# Patient Record
Sex: MALE | Race: ASIAN | URBAN - METROPOLITAN AREA
[De-identification: names, ages, dates, MRNs, and addresses within clinical notes are randomized per-mention and may not be internally consistent; named-entity substitution may affect disease eponyms.]

---

## 2023-12-21 ENCOUNTER — OFFICE VISIT (OUTPATIENT)
Dept: URGENT CARE | Facility: CLINIC | Age: 33
End: 2023-12-21
Payer: COMMERCIAL

## 2023-12-21 VITALS
TEMPERATURE: 98.6 F | WEIGHT: 158.6 LBS | BODY MASS INDEX: 25.49 KG/M2 | OXYGEN SATURATION: 100 % | HEIGHT: 66 IN | HEART RATE: 88 BPM | DIASTOLIC BLOOD PRESSURE: 82 MMHG | RESPIRATION RATE: 20 BRPM | SYSTOLIC BLOOD PRESSURE: 138 MMHG

## 2023-12-21 DIAGNOSIS — Z02.4 DRIVER'S PERMIT PHYSICAL EXAMINATION: Primary | ICD-10-CM

## 2023-12-21 NOTE — PROGRESS NOTES
"  [facility]        NAME: Festus Farah is a 33 y.o. male  : 1990    MRN: [id number]  DATE: 2023  TIME: 4:43 PM    Assessment and Plan   's permit physical examination [Z02.4]  1. 's permit physical examination              Patient Instructions       Follow up with PCP in 3-5 days.  Proceed to  ER if symptoms worsen.    Chief Complaint     Chief Complaint   Patient presents with    Annual Exam     Drivers permit         History of Present Illness       Patient is a 33 year old male presenting to [facility] for 's permit physical examination.  Patient denies any medical history.          Review of Systems   Review of Systems   Constitutional:  Negative for chills and fever.   HENT:  Negative for ear pain and sore throat.    Eyes:  Negative for pain and visual disturbance.   Respiratory:  Negative for cough and shortness of breath.    Cardiovascular:  Negative for chest pain and palpitations.   Gastrointestinal:  Negative for abdominal pain and vomiting.   Genitourinary:  Negative for dysuria and hematuria.   Musculoskeletal:  Negative for arthralgias and back pain.   Skin:  Negative for color change and rash.   Neurological:  Negative for seizures and syncope.   All other systems reviewed and are negative.        Current Medications     No current outpatient medications on file.    Current Allergies     Allergies as of 2023    (No Known Allergies)            The following portions of the patient's history were reviewed and updated as appropriate: allergies, current medications, past family history, past medical history, past social history, past surgical history and problem list.     No past medical history on file.    No past surgical history on file.    No family history on file.      Medications have been verified.        Objective   /82   Pulse 88   Temp 98.6 °F (37 °C)   Resp 20   Ht 5' 6\" (1.676 m)   Wt 71.9 kg (158 lb 9.6 oz)   SpO2 100%   BMI " 25.60 kg/m²   No LMP for male patient.       Physical Exam     Physical Exam  Constitutional:       Appearance: Normal appearance. He is normal weight.   HENT:      Head: Normocephalic and atraumatic.      Nose: Nose normal.      Mouth/Throat:      Mouth: Mucous membranes are moist.   Eyes:      Extraocular Movements: Extraocular movements intact.      Conjunctiva/sclera: Conjunctivae normal.      Pupils: Pupils are equal, round, and reactive to light.   Cardiovascular:      Rate and Rhythm: Normal rate.      Heart sounds: No murmur heard.     No friction rub. No gallop.   Pulmonary:      Effort: Pulmonary effort is normal.      Breath sounds: No wheezing, rhonchi or rales.   Musculoskeletal:         General: Normal range of motion.      Cervical back: Normal range of motion and neck supple.   Skin:     General: Skin is warm and dry.   Neurological:      General: No focal deficit present.      Mental Status: He is alert and oriented to person, place, and time.   Psychiatric:         Mood and Affect: Mood normal.         Behavior: Behavior normal.

## 2024-12-26 ENCOUNTER — HOSPITAL ENCOUNTER (EMERGENCY)
Facility: HOSPITAL | Age: 34
Discharge: HOME | End: 2024-12-27
Attending: EMERGENCY MEDICINE | Admitting: EMERGENCY MEDICINE

## 2024-12-26 DIAGNOSIS — Z87.448 HISTORY OF BLOOD IN URINE: Primary | ICD-10-CM

## 2024-12-26 LAB
BILIRUB UR QL STRIP: NEGATIVE
CLARITY UR: CLEAR
COLOR UR: YELLOW
GLUCOSE UR STRIP-MCNC: ABNORMAL MG/DL
HGB UR QL STRIP.AUTO: NEGATIVE
KETONES UR STRIP-MCNC: NEGATIVE MG/DL
LEUKOCYTE ESTERASE UR QL STRIP: NEGATIVE
NITRITE UR QL STRIP: NEGATIVE
PH UR STRIP.AUTO: 7.5 [PH]
PROT UR STRIP-MCNC: NEGATIVE MG/DL
SP GR UR STRIP.AUTO: 1.01 (ref 1–1.03)
UROBILINOGEN UR STRIP-ACNC: <2 MG/DL

## 2024-12-26 PROCEDURE — 99283 EMERGENCY DEPT VISIT LOW MDM: CPT

## 2024-12-26 PROCEDURE — 99283 EMERGENCY DEPT VISIT LOW MDM: CPT | Performed by: EMERGENCY MEDICINE

## 2024-12-27 VITALS
HEIGHT: 66 IN | RESPIRATION RATE: 18 BRPM | WEIGHT: 160 LBS | SYSTOLIC BLOOD PRESSURE: 141 MMHG | OXYGEN SATURATION: 99 % | BODY MASS INDEX: 25.71 KG/M2 | TEMPERATURE: 97.9 F | DIASTOLIC BLOOD PRESSURE: 84 MMHG | HEART RATE: 93 BPM

## 2024-12-27 NOTE — ED PROVIDER NOTES
Time reflects when diagnosis was documented in both MDM as applicable and the Disposition within this note       Time User Action Codes Description Comment    12/26/2024 11:48 PM Darius Stanford Add [Z87.448] History of blood in urine     12/26/2024 11:48 PM Darius Stanford Modify [Z87.448] History of blood in urine Normal urinalysis          ED Disposition       ED Disposition   Discharge    Condition   Stable    Date/Time   u Dec 26, 2024 11:48 PM    Comment   Festus Urbanel discharge to home/self care.                   Assessment & Plan       Medical Decision Making  34-year-old male presents with concerns for gross hematuria in his urine at urination this morning.  This is his first time he is ever experienced this there was no pain with it.  Differential diagnosis could include urinary retention, kidney stone, gross hematuria due to bladder malignancy.  Will obtain screening urinalysis.  UTI also on differential.  Patient without any clinical evidence of kidney stone urinary retention and does not have any abnormal findings on urinalysis to explain.  Discussed that sometimes the color of the urine may be changed by other things that he is eating or taking or that there could have been some transient irritation or bleeding in the bladder tract which layered at the bottom of the bladder and was peed out in the morning.  Advise continue monitoring and if symptoms return or persist then he should have his urine retested and I will provide urology follow-up.    Problems Addressed:  History of blood in urine: undiagnosed new problem with uncertain prognosis        ED Course as of 12/26/24 2351   Thu Dec 26, 2024   2342 Patient has an unremarkable urinalysis I went back and discussed with the patient the findings.  I gave him reassurance I advised him to seek return to ER for repeat urine testing if the symptoms continue or worsen or he may follow-up with urology as an outpatient will provide contact  information and referral as needed.       Medications - No data to display    ED Risk Strat Scores                          SBIRT 20yo+      Flowsheet Row Most Recent Value   Initial Alcohol Screen: US AUDIT-C     1. How often do you have a drink containing alcohol? 6 Filed at: 12/26/2024 2305   2. How many drinks containing alcohol do you have on a typical day you are drinking?  1 Filed at: 12/26/2024 2305   3a. Male UNDER 65: How often do you have five or more drinks on one occasion? 0 Filed at: 12/26/2024 2305   3b. FEMALE Any Age, or MALE 65+: How often do you have 4 or more drinks on one occassion? 0 Filed at: 12/26/2024 2305   Audit-C Score 7 Filed at: 12/26/2024 2305   Full Alcohol Screen: US AUDIT    4. How often during the last year have you found that you were not able to stop drinking once you had started? 0 Filed at: 12/26/2024 2305   5. How often during past year have you failed to do what was normally expected of you because of drinking?  0 Filed at: 12/26/2024 2305   6. How often in past year have you needed a first drink in the morning to get yourself going after a heavy drinking session?  0 Filed at: 12/26/2024 2305   7. How often in past year have you had feeling of guilt or remorse after drinking?  0 Filed at: 12/26/2024 2305   8. How often in past year have you been unable to remember what happened night before because you had been drinking?  0 Filed at: 12/26/2024 2305   9. Have you or someone else been injured as a result of your drinking?  0 Filed at: 12/26/2024 2305   10. Has a relative, friend, doctor or other health worker been concerned about your drinking and suggested you cut down?  0 Filed at: 12/26/2024 2305   AUDIT Total Score 7 Filed at: 12/26/2024 2305   SRAVAN: How many times in the past year have you...    Used an illegal drug or used a prescription medication for non-medical reasons? Never Filed at: 12/26/2024 2305                            History of Present Illness       Chief  Complaint   Patient presents with    Blood in Urine     Patient states that when he went to the bathroom this morning he noticed a small amount of blood in his urine. Denies abd pain       History reviewed. No pertinent past medical history.   History reviewed. No pertinent surgical history.   History reviewed. No pertinent family history.   Social History     Tobacco Use    Smoking status: Never     Passive exposure: Never    Smokeless tobacco: Never   Vaping Use    Vaping status: Never Used   Substance Use Topics    Alcohol use: Yes     Alcohol/week: 7.0 standard drinks of alcohol     Types: 7 Cans of beer per week    Drug use: Not Currently      E-Cigarette/Vaping    E-Cigarette Use Never User       E-Cigarette/Vaping Substances      I have reviewed and agree with the history as documented.     34-year-old male without significant past medical history who presents to the ER for evaluation of gross hematuria which he noticed this morning when urinating.  Denies any associated dysuria groin pain testicular pain lower abdominal pain suprapubic pain abdominal distention flank pain nausea vomiting.  Denies bleeding disorders.  Denies any rashes in the groin area.  No history of similar.  He states he only noticed today when he first went to urinate in the morning.  It was at the beginning of urination.  It has not persisted since.  Denies any history of STI.  Occasionally chews tobacco but does not smoke tobacco no history of bladder cancer.        Blood in Urine  Pertinent negatives include no abdominal pain, chills, dysuria, fever, flank pain, nausea or vomiting.       Review of Systems   Constitutional:  Negative for activity change, appetite change, chills and fever.   HENT:  Negative for ear pain and sore throat.    Eyes:  Negative for pain and visual disturbance.   Respiratory:  Negative for cough and shortness of breath.    Cardiovascular:  Negative for chest pain and palpitations.   Gastrointestinal:  Negative  for abdominal pain, diarrhea, nausea and vomiting.   Genitourinary:  Positive for hematuria. Negative for dysuria, flank pain, genital sores, penile discharge, penile pain, penile swelling, scrotal swelling and testicular pain.   Musculoskeletal:  Negative for arthralgias and back pain.   Skin:  Negative for color change and rash.   Neurological:  Negative for seizures, syncope, light-headedness and headaches.   All other systems reviewed and are negative.          Objective       ED Triage Vitals [12/26/24 2307]   Temperature Pulse Blood Pressure Respirations SpO2 Patient Position - Orthostatic VS   97.9 °F (36.6 °C) 84 165/97 18 98 % Sitting      Temp Source Heart Rate Source BP Location FiO2 (%) Pain Score    Temporal Monitor Right arm -- No Pain      Vitals      Date and Time Temp Pulse SpO2 Resp BP Pain Score FACES Pain Rating User   12/26/24 2307 97.9 °F (36.6 °C) 84 98 % 18 165/97 No Pain -- AP            Physical Exam  Vitals and nursing note reviewed.   Constitutional:       General: He is not in acute distress.     Appearance: He is well-developed.   HENT:      Head: Normocephalic and atraumatic.      Right Ear: External ear normal.      Left Ear: External ear normal.      Nose: Nose normal.      Mouth/Throat:      Mouth: Mucous membranes are moist.      Pharynx: Oropharynx is clear.   Eyes:      Conjunctiva/sclera: Conjunctivae normal.   Cardiovascular:      Rate and Rhythm: Normal rate and regular rhythm.      Heart sounds: No murmur heard.  Pulmonary:      Effort: Pulmonary effort is normal. No respiratory distress.      Breath sounds: Normal breath sounds.   Abdominal:      Palpations: Abdomen is soft.      Tenderness: There is no abdominal tenderness.   Musculoskeletal:         General: No swelling.      Cervical back: Neck supple.   Skin:     General: Skin is warm and dry.      Capillary Refill: Capillary refill takes less than 2 seconds.   Neurological:      Mental Status: He is alert.    Psychiatric:         Mood and Affect: Mood normal.         Results Reviewed       Procedure Component Value Units Date/Time    UA w Reflex to Microscopic w Reflex to Culture [[id number]]  (Abnormal) Collected: 12/26/24 2333    Lab Status: Final result Specimen: Urine, Clean Catch Updated: 12/26/24 2341     Color, UA Yellow     Clarity, UA Clear     Specific Gravity, UA 1.010     pH, UA 7.5     Leukocytes, UA Negative     Nitrite, UA Negative     Protein, UA Negative mg/dl      Glucose, UA 1000 (1%) mg/dl      Ketones, UA Negative mg/dl      Urobilinogen, UA <2.0 mg/dl      Bilirubin, UA Negative     Occult Blood, UA Negative            No orders to display       Procedures    ED Medication and Procedure Management   None     Patient's Medications    No medications on file     No discharge procedures on file.  ED SEPSIS DOCUMENTATION   Time reflects when diagnosis was documented in both MDM as applicable and the Disposition within this note       Time User Action Codes Description Comment    12/26/2024 11:48 PM Darius Stanford Add [Z87.448] History of blood in urine     12/26/2024 11:48 PM Darius Stanford Modify [Z87.448] History of blood in urine Normal urinalysis                 Darius Stanford DO  12/26/24 0542

## 2024-12-27 NOTE — DISCHARGE INSTRUCTIONS
Thank you for visiting the Emergency Department today.    Labs Reviewed   UA W REFLEX TO MICROSCOPIC WITH REFLEX TO CULTURE - Abnormal       Result Value Ref Range Status    Color, UA Yellow   Final    Clarity, UA Clear   Final    Specific Gravity, UA 1.010  1.005 - 1.030 Final    pH, UA 7.5  4.5, 5.0, 5.5, 6.0, 6.5, 7.0, 7.5, 8.0 Final    Leukocytes, UA Negative  Negative Final    Nitrite, UA Negative  Negative Final    Protein, UA Negative  Negative mg/dl Final    Glucose, UA 1000 (1%) (*) Negative mg/dl Final    Comment: Elevated glucose may cause false negative leukocyte esterase    Ketones, UA Negative  Negative mg/dl Final    Urobilinogen, UA <2.0  <2.0 mg/dl mg/dl Final    Bilirubin, UA Negative  Negative Final    Occult Blood, UA Negative  Negative Final     No evidence of blood or infection in the urine.  There was some glucose in the urine this is normal this is not of concern.  Return here if symptoms worsen or follow-up with urology if symptoms worsen otherwise continue to monitor.  No signs of any acute pathology on urine testing.

## 2024-12-29 ENCOUNTER — APPOINTMENT (EMERGENCY)
Dept: ULTRASOUND IMAGING | Facility: HOSPITAL | Age: 34
End: 2024-12-29

## 2024-12-29 ENCOUNTER — HOSPITAL ENCOUNTER (EMERGENCY)
Facility: HOSPITAL | Age: 34
Discharge: HOME | End: 2024-12-29
Attending: EMERGENCY MEDICINE

## 2024-12-29 VITALS
OXYGEN SATURATION: 98 % | BODY MASS INDEX: 25.82 KG/M2 | TEMPERATURE: 98.2 F | HEART RATE: 79 BPM | DIASTOLIC BLOOD PRESSURE: 87 MMHG | WEIGHT: 160 LBS | RESPIRATION RATE: 16 BRPM | SYSTOLIC BLOOD PRESSURE: 151 MMHG

## 2024-12-29 DIAGNOSIS — R74.01 TRANSAMINITIS: ICD-10-CM

## 2024-12-29 DIAGNOSIS — R36.1 HEMATOSPERMIA: ICD-10-CM

## 2024-12-29 DIAGNOSIS — R31.29 MICROSCOPIC HEMATURIA: Primary | ICD-10-CM

## 2024-12-29 DIAGNOSIS — R73.9 HYPERGLYCEMIA: ICD-10-CM

## 2024-12-29 LAB
ALBUMIN SERPL BCG-MCNC: 4.7 G/DL (ref 3.5–5)
ALP SERPL-CCNC: 68 U/L (ref 34–104)
ALT SERPL W P-5'-P-CCNC: 125 U/L (ref 7–52)
ANION GAP SERPL CALCULATED.3IONS-SCNC: 10 MMOL/L (ref 4–13)
AST SERPL W P-5'-P-CCNC: 63 U/L (ref 13–39)
BACTERIA UR QL AUTO: ABNORMAL /HPF
BASOPHILS # BLD AUTO: 0.04 THOUSANDS/ΜL (ref 0–0.1)
BASOPHILS NFR BLD AUTO: 1 % (ref 0–1)
BILIRUB SERPL-MCNC: 0.7 MG/DL (ref 0.2–1)
BILIRUB UR QL STRIP: NEGATIVE
BUN SERPL-MCNC: 9 MG/DL (ref 5–25)
CALCIUM SERPL-MCNC: 9.7 MG/DL (ref 8.4–10.2)
CHLORIDE SERPL-SCNC: 97 MMOL/L (ref 96–108)
CLARITY UR: CLEAR
CO2 SERPL-SCNC: 25 MMOL/L (ref 21–32)
COLOR UR: YELLOW
CREAT SERPL-MCNC: 0.85 MG/DL (ref 0.6–1.3)
EOSINOPHIL # BLD AUTO: 0.04 THOUSAND/ΜL (ref 0–0.61)
EOSINOPHIL NFR BLD AUTO: 1 % (ref 0–6)
ERYTHROCYTE [DISTWIDTH] IN BLOOD BY AUTOMATED COUNT: 12.8 % (ref 11.6–15.1)
GFR SERPL CREATININE-BSD FRML MDRD: 113 ML/MIN/1.73SQ M
GLUCOSE SERPL-MCNC: 255 MG/DL (ref 65–140)
GLUCOSE UR STRIP-MCNC: ABNORMAL MG/DL
HCT VFR BLD AUTO: 43.4 % (ref 36.5–49.3)
HGB BLD-MCNC: 14.8 G/DL (ref 12–17)
HGB UR QL STRIP.AUTO: ABNORMAL
IMM GRANULOCYTES # BLD AUTO: 0.02 THOUSAND/UL (ref 0–0.2)
IMM GRANULOCYTES NFR BLD AUTO: 0 % (ref 0–2)
KETONES UR STRIP-MCNC: NEGATIVE MG/DL
LEUKOCYTE ESTERASE UR QL STRIP: NEGATIVE
LYMPHOCYTES # BLD AUTO: 0.89 THOUSANDS/ΜL (ref 0.6–4.47)
LYMPHOCYTES NFR BLD AUTO: 20 % (ref 14–44)
MCH RBC QN AUTO: 28.1 PG (ref 26.8–34.3)
MCHC RBC AUTO-ENTMCNC: 34.1 G/DL (ref 31.4–37.4)
MCV RBC AUTO: 83 FL (ref 82–98)
MONOCYTES # BLD AUTO: 0.44 THOUSAND/ΜL (ref 0.17–1.22)
MONOCYTES NFR BLD AUTO: 10 % (ref 4–12)
NEUTROPHILS # BLD AUTO: 3.13 THOUSANDS/ΜL (ref 1.85–7.62)
NEUTS SEG NFR BLD AUTO: 68 % (ref 43–75)
NITRITE UR QL STRIP: NEGATIVE
NON-SQ EPI CELLS URNS QL MICRO: ABNORMAL /HPF
NRBC BLD AUTO-RTO: 0 /100 WBCS
PH UR STRIP.AUTO: 6 [PH]
PLATELET # BLD AUTO: 223 THOUSANDS/UL (ref 149–390)
PMV BLD AUTO: 8.9 FL (ref 8.9–12.7)
POTASSIUM SERPL-SCNC: 4.6 MMOL/L (ref 3.5–5.3)
PROT SERPL-MCNC: 6.8 G/DL (ref 6.4–8.4)
PROT UR STRIP-MCNC: NEGATIVE MG/DL
RBC # BLD AUTO: 5.26 MILLION/UL (ref 3.88–5.62)
RBC #/AREA URNS AUTO: ABNORMAL /HPF
SODIUM SERPL-SCNC: 132 MMOL/L (ref 135–147)
SP GR UR STRIP.AUTO: 1.02 (ref 1–1.03)
UROBILINOGEN UR STRIP-ACNC: <2 MG/DL
WBC # BLD AUTO: 4.56 THOUSAND/UL (ref 4.31–10.16)
WBC #/AREA URNS AUTO: ABNORMAL /HPF

## 2024-12-29 PROCEDURE — 87591 N.GONORRHOEAE DNA AMP PROB: CPT | Performed by: EMERGENCY MEDICINE

## 2024-12-29 PROCEDURE — 83036 HEMOGLOBIN GLYCOSYLATED A1C: CPT | Performed by: EMERGENCY MEDICINE

## 2024-12-29 PROCEDURE — 99283 EMERGENCY DEPT VISIT LOW MDM: CPT

## 2024-12-29 PROCEDURE — G0103 PSA SCREENING: HCPCS | Performed by: EMERGENCY MEDICINE

## 2024-12-29 PROCEDURE — 36415 COLL VENOUS BLD VENIPUNCTURE: CPT | Performed by: EMERGENCY MEDICINE

## 2024-12-29 PROCEDURE — 85025 COMPLETE CBC W/AUTO DIFF WBC: CPT | Performed by: EMERGENCY MEDICINE

## 2024-12-29 PROCEDURE — 87491 CHLMYD TRACH DNA AMP PROBE: CPT | Performed by: EMERGENCY MEDICINE

## 2024-12-29 PROCEDURE — 81001 URINALYSIS AUTO W/SCOPE: CPT | Performed by: EMERGENCY MEDICINE

## 2024-12-29 PROCEDURE — 99284 EMERGENCY DEPT VISIT MOD MDM: CPT | Performed by: EMERGENCY MEDICINE

## 2024-12-29 PROCEDURE — 80053 COMPREHEN METABOLIC PANEL: CPT | Performed by: EMERGENCY MEDICINE

## 2024-12-29 PROCEDURE — 76870 US EXAM SCROTUM: CPT

## 2024-12-29 NOTE — DISCHARGE INSTRUCTIONS
Thank you for visiting the Emergency Department today.    Blood in urine (hematuria), blood in semen (hematospermia):  -This is typically benign and resolves on its own.  -Harmless causes could include a ruptured blood vessel  -More concerning causes may represent bladder or urothelial cancer  -There was no evidence of infection  -Ultrasound did not demonstrate any masses or evidence of cancer  -Call urology for follow-up ASAP, referral is ordered [phone]     Ultrasound findings:  -There were 2 nonspecific findings on ultrasound which are typically harmless  -6 mm epididymal head cyst (benign)  -Small complex right hydrocele (fluid collection, typically harmless, but of unclear cause)  -Proceed with urology follow-up for further discussion/management of these findings    Elevated blood sugar (hyperglycemia):  -We send hemoglobin A1c this result does not come back today  -This is the blood test to confirm type 2 diabetes.  You may have type 2 diabetes.  -You need follow-up with a primary care provider (PCP)  -A referral is made to family practice.  Contact the [facility] to schedule follow-up visit [phone]     Elevated liver enzymes (transaminitis):  -Your liver enzymes called AST and ALT were mildly elevated.  -This is felt to be an incidental finding at this time.   -In follow-up with your PCP they may recheck labs to see if this problem is resolving or stable and if worsening will refer you to gastroenterology for further testing or possible imaging of the liver    Return here sooner if symptoms worsen or develop new features or you are unable to follow-up for any reason.

## 2024-12-29 NOTE — ED PROVIDER NOTES
Time reflects when diagnosis was documented in both MDM as applicable and the Disposition within this note       Time User Action Codes Description Comment    12/29/2024  1:34 PM Darius Stanford [R31.29] Microscopic hematuria     12/29/2024  1:34 PM Darius Stanford [R36.1] Hematospermia     12/29/2024  1:34 PM Darius Stanford Add [R74.01] Transaminitis     12/29/2024  1:35 PM Darius Stanford [R73.9] Hyperglycemia           ED Disposition       ED Disposition   Discharge    Condition   Stable    Date/Time   Sun Dec 29, 2024  3:26 PM    Comment   Festus Urbanel discharge to home/self care.                   Assessment & Plan       Medical Decision Making  34-year-old male presenting for evaluation of an episode of hematospermia.  Had an episode of hematuria several days prior.  At that time urine testing was unremarkable and the patient was reassured.  He seems particularly concerned about the symptoms.  I reviewed the differential diagnosis with the patient.  I have low suspicion for malignancy but we can check a PSA and scrotal ultrasound.  I have low suspicion for parasitic infection but Schistosoma could cause this and with patient's history of living in [place] it does remain on the differential discussed that we can check stool ova and parasite.  Overall try to reassure patient that these are typically due to benign causes.  Will check screening labs to rule out other abnormalities which may be related in someway or indicate more serious pathology and if negative may serve to reassure patient and ultimately will need urology follow-up if the problem persist.    Problems Addressed:  Hematospermia: acute illness or injury  Hyperglycemia: undiagnosed new problem with uncertain prognosis  Microscopic hematuria: acute illness or injury  Transaminitis: undiagnosed new problem with uncertain prognosis    Amount and/or Complexity of Data Reviewed  Labs: ordered. Decision-making details documented  in ED Course.  Radiology: ordered.        ED Course as of 12/29/24 1527   Sun Dec 29, 2024   1320 RBC Urine(!): 10-20  Microscopic hematuria, not seen previously on last ED visit   1333 WBC: 4.56   1333 Hemoglobin: 14.8   1333 Platelet Count: 223   1333 Eosinophils %: 1   1334 AST(!): 63   1334 ALT(!): 125  Mild elevated AST ALT, nonspecific   1517 I extensively reviewed all the nonspecific abnormalities including hyperglycemia transaminitis complex right hydrocele epididymal head cyst microscopic hematuria with the patient and discussed further follow-up and possible etiologies.  At this time no indication for admission or further ER testing.  Pending PSA and hemoglobin A1c.  Will refer to [facility] clinic and provide referral for family practice as well as provide referral and follow-up with urology.  Patient given return to ER precautions.  All questions answered.  Will plan for discharge.       Medications - No data to display    ED Risk Strat Scores                          SBIRT 20yo+      Flowsheet Row Most Recent Value   Initial Alcohol Screen: US AUDIT-C     1. How often do you have a drink containing alcohol? 0 Filed at: 12/29/2024 1226   2. How many drinks containing alcohol do you have on a typical day you are drinking?  0 Filed at: 12/29/2024 1226   3a. Male UNDER 65: How often do you have five or more drinks on one occasion? 0 Filed at: 12/29/2024 1226   Audit-C Score 0 Filed at: 12/29/2024 1226   SRAVAN: How many times in the past year have you...    Used an illegal drug or used a prescription medication for non-medical reasons? Never Filed at: 12/29/2024 1226                            History of Present Illness       Chief Complaint   Patient presents with    Medical Problem     States that for the past 2 days he after he is done masturbating he has blood in his sperm and is concerned. Denies having any pain        History reviewed. No pertinent past medical history.   History reviewed. No  pertinent surgical history.   History reviewed. No pertinent family history.   Social History     Tobacco Use    Smoking status: Never     Passive exposure: Never    Smokeless tobacco: Never   Vaping Use    Vaping status: Never Used   Substance Use Topics    Alcohol use: Yes     Alcohol/week: 7.0 standard drinks of alcohol     Types: 7 Cans of beer per week    Drug use: Not Currently      E-Cigarette/Vaping    E-Cigarette Use Never User       E-Cigarette/Vaping Substances      I have reviewed and agree with the history as documented.     This is a 34-year-old male no significant past medical history has been living [place] for about 3 years was living in [facility] prior to this.  Reports up-to-date on all vaccines when he was living in [facility] including COVID.  I saw this patient a few days ago for evaluation of an isolated episode of gross hematuria with unremarkable urinalysis and reassurance and discharge with outpatient urology follow-up.  Patient states that the urine did remain clear/yellow for the next 2 days but then after an episode of masturbation he had noticed blood-tinged semen x 1 episode and this significantly concerned/distressed him and he sought reevaluation.  Continues to deny any groin rashes lesions swelling.  Denies history of unprotected sex.  Denies history of STI.  Denies history of any known parasitic infections.  Denies known history of cancer.  Denies any rectal pain pain with defecation blood in stool.  Denies any abdominal pain.      Medical Problem  Associated symptoms: no abdominal pain, no chest pain, no cough, no ear pain, no fever, no rash, no shortness of breath, no sore throat and no vomiting        Review of Systems   Constitutional:  Negative for chills and fever.   HENT:  Negative for ear pain and sore throat.    Eyes:  Negative for pain and visual disturbance.   Respiratory:  Negative for cough and shortness of breath.    Cardiovascular:  Negative for chest pain and  palpitations.   Gastrointestinal:  Negative for abdominal pain and vomiting.   Genitourinary:  Negative for dysuria and hematuria.        Hematuria, hematospermia   Musculoskeletal:  Negative for arthralgias and back pain.   Skin:  Negative for color change and rash.   Neurological:  Negative for seizures and syncope.   All other systems reviewed and are negative.          Objective       ED Triage Vitals [12/29/24 1224]   Temperature Pulse Blood Pressure Respirations SpO2 Patient Position - Orthostatic VS   97.9 °F (36.6 °C) 95 (!) 168/102 18 98 % Sitting      Temp Source Heart Rate Source BP Location FiO2 (%) Pain Score    Temporal Monitor Right arm -- No Pain      Vitals      Date and Time Temp Pulse SpO2 Resp BP Pain Score FACES Pain Rating User   12/29/24 1420 97.6 °F (36.4 °C) 83 99 % 18 152/97 No Pain -- AP   12/29/24 1224 97.9 °F (36.6 °C) 95 98 % 18 168/102 No Pain -- KS            Physical Exam  Vitals and nursing note reviewed.   Constitutional:       General: He is not in acute distress.     Appearance: He is well-developed.   HENT:      Head: Normocephalic and atraumatic.   Eyes:      Conjunctiva/sclera: Conjunctivae normal.   Cardiovascular:      Rate and Rhythm: Normal rate and regular rhythm.      Heart sounds: No murmur heard.  Pulmonary:      Effort: Pulmonary effort is normal. No respiratory distress.      Breath sounds: Normal breath sounds.   Abdominal:      Palpations: Abdomen is soft.      Tenderness: There is no abdominal tenderness.   Genitourinary:     Penis: Normal.       Testes: Normal.   Musculoskeletal:         General: No swelling.      Cervical back: Neck supple.   Skin:     General: Skin is warm and dry.      Capillary Refill: Capillary refill takes less than 2 seconds.   Neurological:      Mental Status: He is alert.   Psychiatric:         Mood and Affect: Mood normal.         Results Reviewed       Procedure Component Value Units Date/Time    Chlamydia/GC amplified DNA by PCR  [[id number]] Collected: 12/29/24 1419    Lab Status: In process Specimen: Urine, Other Updated: 12/29/24 1423    Hemoglobin A1C [[id number]] Collected: 12/29/24 1301    Lab Status: In process Specimen: Blood from Arm, Right Updated: 12/29/24 1347    Comprehensive metabolic panel [[id number]]  (Abnormal) Collected: 12/29/24 1301    Lab Status: Final result Specimen: Blood from Arm, Right Updated: 12/29/24 1333     Sodium 132 mmol/L      Potassium 4.6 mmol/L      Chloride 97 mmol/L      CO2 25 mmol/L      ANION GAP 10 mmol/L      BUN 9 mg/dL      Creatinine 0.85 mg/dL      Glucose 255 mg/dL      Calcium 9.7 mg/dL      AST 63 U/L       U/L      Alkaline Phosphatase 68 U/L      Total Protein 6.8 g/dL      Albumin 4.7 g/dL      Total Bilirubin 0.70 mg/dL      eGFR 113 ml/min/1.73sq m     Narrative:      National Kid[employer] Disease Foundation guidelines for Chronic Kidney Disease (CKD):     Stage 1 with normal or high GFR (GFR > 90 mL/min/1.73 square meters)    Stage 2 Mild CKD (GFR = 60-89 mL/min/1.73 square meters)    Stage 3A Moderate CKD (GFR = 45-59 mL/min/1.73 square meters)    Stage 3B Moderate CKD (GFR = 30-44 mL/min/1.73 square meters)    Stage 4 Severe CKD (GFR = 15-29 mL/min/1.73 square meters)    Stage 5 End Stage CKD (GFR <15 mL/min/1.73 square meters)  Note: GFR calculation is accurate only with a steady state creatinine    Urine Microscopic [[id number]]  (Abnormal) Collected: 12/29/24 1248    Lab Status: Final result Specimen: Urine, Clean Catch Updated: 12/29/24 1315     RBC, UA 10-20 /hpf      WBC, UA 2-4 /hpf      Epithelial Cells Occasional /hpf      Bacteria, UA Occasional /hpf     CBC and differential [[id number]] Collected: 12/29/24 1301    Lab Status: Final result Specimen: Blood from Arm, Right Updated: 12/29/24 1314     WBC 4.56 Thousand/uL      RBC 5.26 Million/uL      Hemoglobin 14.8 g/dL      Hematocrit 43.4 %      MCV 83 fL      MCH 28.1 pg      MCHC 34.1 g/dL      RDW 12.8 %      MPV 8.9 fL       Platelets 223 Thousands/uL      nRBC 0 /100 WBCs      Segmented % 68 %      Immature Grans % 0 %      Lymphocytes % 20 %      Monocytes % 10 %      Eosinophils Relative 1 %      Basophils Relative 1 %      Absolute Neutrophils 3.13 Thousands/µL      Absolute Immature Grans 0.02 Thousand/uL      Absolute Lymphocytes 0.89 Thousands/µL      Absolute Monocytes 0.44 Thousand/µL      Eosinophils Absolute 0.04 Thousand/µL      Basophils Absolute 0.04 Thousands/µL     PSA, Total Screen [[id number]] Collected: 12/29/24 1301    Lab Status: In process Specimen: Blood from Arm, Right Updated: 12/29/24 1305    UA w Reflex to Microscopic w Reflex to Culture [[id number]]  (Abnormal) Collected: 12/29/24 1248    Lab Status: Final result Specimen: Urine, Clean Catch Updated: 12/29/24 1255     Color, UA Yellow     Clarity, UA Clear     Specific Gravity, UA 1.020     pH, UA 6.0     Leukocytes, UA Negative     Nitrite, UA Negative     Protein, UA Negative mg/dl      Glucose, UA 1000 (1%) mg/dl      Ketones, UA Negative mg/dl      Urobilinogen, UA <2.0 mg/dl      Bilirubin, UA Negative     Occult Blood, UA Small    Ova and parasite examination [[id number]]     Lab Status: No result Specimen: Stool             US scrotum and testicles   Final Interpretation by Antonio Bravo MD (12/29 2179)      1. There is no evidence of intratesticular mass or torsion.   2. Small complex right hydrocele         Workstation performed: [id number]             Procedures    ED Medication and Procedure Management   None     Patient's Medications    No medications on file       ED SEPSIS DOCUMENTATION   Time reflects when diagnosis was documented in both MDM as applicable and the Disposition within this note       Time User Action Codes Description Comment    12/29/2024  1:34 PM Darius Stanford Add [R31.29] Microscopic hematuria     12/29/2024  1:34 PM Darius Stanford Add [R36.1] Hematospermia     12/29/2024  1:34 PM Darius Stanford Add [R74.01]  Transaminitis     12/29/2024  1:35 PM Darius Stanford Add [R73.9] Hyperglycemia                  Darius Stanford DO  12/29/24 1527

## 2024-12-30 ENCOUNTER — TELEPHONE (OUTPATIENT)
Age: 34
End: 2024-12-30

## 2024-12-30 ENCOUNTER — RESULTS FOLLOW-UP (OUTPATIENT)
Dept: EMERGENCY DEPT | Facility: HOSPITAL | Age: 34
End: 2024-12-30

## 2024-12-30 LAB
C TRACH DNA SPEC QL NAA+PROBE: NEGATIVE
EST. AVERAGE GLUCOSE BLD GHB EST-MCNC: 223 MG/DL
HBA1C MFR BLD: 9.4 %
N GONORRHOEA DNA SPEC QL NAA+PROBE: NEGATIVE
PSA SERPL-MCNC: 1.01 NG/ML (ref 0–4)

## 2024-12-30 NOTE — TELEPHONE ENCOUNTER
New Patient    What is the reason for the patient’s appointment?: Patient calling to schedule an appt because he has been seeing blood in his urine for about a week. He denies seeing any clots.     What office location does the patient prefer?: [place]    Does patient have Imaging/Lab Results: US scrotum and testicles done on 12/29    IMPRESSION:     1. There is no evidence of intratesticular mass or torsion.  2. Small complex right hydrocele    Have patient records been requested?:  If No, are the records showing in Epic:       INSURANCE:   Do we accept the patient's insurance or is the patient Self-Pay?: Self pay (email sent to Luis Alberto)    Insurance Provider:  Plan Type/Number:   Member ID#:       HISTORY:   Has the patient had any previous Urologist(s)?: no    Was the patient seen in the ED?: Yes on 12/29    Patient scheduled with Antonio on 2/6 at 840 in [place]     Patient is requesting to mail an appt card with his appt information. Confirmed address on file is correct

## 2024-12-31 ENCOUNTER — TELEPHONE (OUTPATIENT)
Age: 34
End: 2024-12-31

## 2025-01-10 ENCOUNTER — TELEPHONE (OUTPATIENT)
Dept: FAMILY MEDICINE CLINIC | Facility: HOME HEALTHCARE | Age: 35
End: 2025-01-10

## 2025-01-13 ENCOUNTER — TELEPHONE (OUTPATIENT)
Dept: FAMILY MEDICINE CLINIC | Facility: HOME HEALTHCARE | Age: 35
End: 2025-01-13

## 2025-01-20 ENCOUNTER — HOSPITAL ENCOUNTER (OUTPATIENT)
Dept: ULTRASOUND IMAGING | Facility: HOSPITAL | Age: 35
Discharge: HOME | End: 2025-01-20

## 2025-01-20 DIAGNOSIS — R31.0 GROSS HEMATURIA: ICD-10-CM

## 2025-01-20 PROCEDURE — 76775 US EXAM ABDO BACK WALL LIM: CPT

## 2025-02-05 ENCOUNTER — TELEPHONE (OUTPATIENT)
Dept: UROLOGY | Facility: CLINIC | Age: 35
End: 2025-02-05

## 2025-02-05 NOTE — TELEPHONE ENCOUNTER
I left voice message, he has appt on 2/6/25 at 8:40 am with Ricardo Guzman. Appt needs to rescheduled due to weather.